# Patient Record
Sex: FEMALE | Race: AMERICAN INDIAN OR ALASKA NATIVE | NOT HISPANIC OR LATINO | URBAN - METROPOLITAN AREA
[De-identification: names, ages, dates, MRNs, and addresses within clinical notes are randomized per-mention and may not be internally consistent; named-entity substitution may affect disease eponyms.]

---

## 2017-03-15 ENCOUNTER — OUTPATIENT (OUTPATIENT)
Dept: OUTPATIENT SERVICES | Facility: HOSPITAL | Age: 67
LOS: 1 days | Discharge: HOME | End: 2017-03-15

## 2017-06-27 DIAGNOSIS — Z00.8 ENCOUNTER FOR OTHER GENERAL EXAMINATION: ICD-10-CM

## 2017-11-16 ENCOUNTER — OUTPATIENT (OUTPATIENT)
Dept: OUTPATIENT SERVICES | Facility: HOSPITAL | Age: 67
LOS: 1 days | Discharge: HOME | End: 2017-11-16

## 2017-11-16 DIAGNOSIS — E03.9 HYPOTHYROIDISM, UNSPECIFIED: ICD-10-CM

## 2017-11-16 DIAGNOSIS — R53.82 CHRONIC FATIGUE, UNSPECIFIED: ICD-10-CM

## 2019-02-08 ENCOUNTER — APPOINTMENT (OUTPATIENT)
Dept: CARDIOTHORACIC SURGERY | Facility: CLINIC | Age: 69
End: 2019-02-08

## 2019-02-08 VITALS
HEIGHT: 64 IN | TEMPERATURE: 98.2 F | DIASTOLIC BLOOD PRESSURE: 77 MMHG | WEIGHT: 116 LBS | RESPIRATION RATE: 13 BRPM | SYSTOLIC BLOOD PRESSURE: 136 MMHG | HEART RATE: 77 BPM | BODY MASS INDEX: 19.81 KG/M2 | OXYGEN SATURATION: 95 %

## 2019-02-08 DIAGNOSIS — Z86.39 PERSONAL HISTORY OF OTHER ENDOCRINE, NUTRITIONAL AND METABOLIC DISEASE: ICD-10-CM

## 2019-02-08 DIAGNOSIS — Z86.79 PERSONAL HISTORY OF OTHER DISEASES OF THE CIRCULATORY SYSTEM: ICD-10-CM

## 2019-02-08 DIAGNOSIS — R91.8 OTHER NONSPECIFIC ABNORMAL FINDING OF LUNG FIELD: ICD-10-CM

## 2019-02-08 DIAGNOSIS — Z84.89 FAMILY HISTORY OF OTHER SPECIFIED CONDITIONS: ICD-10-CM

## 2019-02-08 DIAGNOSIS — Z82.49 FAMILY HISTORY OF ISCHEMIC HEART DISEASE AND OTHER DISEASES OF THE CIRCULATORY SYSTEM: ICD-10-CM

## 2019-02-08 DIAGNOSIS — Z78.9 OTHER SPECIFIED HEALTH STATUS: ICD-10-CM

## 2019-02-08 DIAGNOSIS — Z87.891 PERSONAL HISTORY OF NICOTINE DEPENDENCE: ICD-10-CM

## 2019-02-08 DIAGNOSIS — Z83.3 FAMILY HISTORY OF DIABETES MELLITUS: ICD-10-CM

## 2019-02-08 DIAGNOSIS — Z80.9 FAMILY HISTORY OF MALIGNANT NEOPLASM, UNSPECIFIED: ICD-10-CM

## 2019-02-08 PROBLEM — Z00.00 ENCOUNTER FOR PREVENTIVE HEALTH EXAMINATION: Status: ACTIVE | Noted: 2019-02-08

## 2019-02-08 RX ORDER — CHOLECALCIFEROL (VITAMIN D3) 125 MCG
TABLET ORAL
Refills: 0 | Status: ACTIVE | COMMUNITY

## 2019-02-08 RX ORDER — BIOTIN 10 MG
TABLET ORAL
Refills: 0 | Status: ACTIVE | COMMUNITY

## 2019-02-08 NOTE — PHYSICAL EXAM
[General Appearance - Alert] : alert [General Appearance - In No Acute Distress] : in no acute distress [Sclera] : the sclera and conjunctiva were normal [PERRL With Normal Accommodation] : pupils were equal in size, round, and reactive to light [Extraocular Movements] : extraocular movements were intact [Outer Ear] : the ears and nose were normal in appearance [Oropharynx] : the oropharynx was normal [Neck Appearance] : the appearance of the neck was normal [Neck Cervical Mass (___cm)] : no neck mass was observed [Jugular Venous Distention Increased] : there was no jugular-venous distention [Thyroid Diffuse Enlargement] : the thyroid was not enlarged [Thyroid Nodule] : there were no palpable thyroid nodules [Abnormal Walk] : normal gait [Nail Clubbing] : no clubbing  or cyanosis of the fingernails [Musculoskeletal - Swelling] : no joint swelling seen [Motor Tone] : muscle strength and tone were normal [Skin Color & Pigmentation] : normal skin color and pigmentation [Skin Turgor] : normal skin turgor [] : no rash [Deep Tendon Reflexes (DTR)] : deep tendon reflexes were 2+ and symmetric [Sensation] : the sensory exam was normal to light touch and pinprick [No Focal Deficits] : no focal deficits [Oriented To Time, Place, And Person] : oriented to person, place, and time [Impaired Insight] : insight and judgment were intact [Affect] : the affect was normal

## 2019-02-12 RX ORDER — PROPRANOLOL HYDROCHLORIDE 10 MG/1
10 TABLET ORAL
Refills: 0 | Status: ACTIVE | COMMUNITY

## 2019-02-12 NOTE — CONSULT LETTER
[Dear  ___] : Dear  [unfilled], [Consult Letter:] : I had the pleasure of evaluating your patient, [unfilled]. [Consult Closing:] : Thank you very much for allowing me to participate in the care of this patient.  If you have any questions, please do not hesitate to contact me. 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 She presently denies any SOB, nausea, vomiting, night sweats. She states that she has lost approximately 20 lbs in the past 1 year, unintentional which she attributed originally to her hyperthyroidism.  The patient has middle lobe and lingular lesions, which may raise the question of MAC infection.  She has a upper lobe lesion inferiorly and laterally that has a morphology more consistent with infection, but did not resolve on levofloxacin.  The suspicion is for infectious etiology but we did discuss that we need to ensure that we are not missing a pulmonary malignancy.  I would note that all of the present lesions would not be consistent with breast metastasis based on morphology and distribution.\par \par Plan:   bronchoscopy with LMA tentatively on 2/15/19 in the AM with lavage, separate lavage for each affected segment\par           repeat CT scan\par           surgical vs CT guided biopsy if non resolution [FreeTextEntry3] : Fabian Abreu M.D.\par Chief, Division of Thoracic Surgery\par Department of Cardiothoracic Surgery\par

## 2019-02-12 NOTE — HISTORY OF PRESENT ILLNESS
[Dyslipidemia] : Dyslipidemia [Hypertension] : Hypertension [FreeTextEntry1] : Ms. Alexia Kay is a 69 y/o F who presents to our office for evaluation for Right upper lobe opacity. She presently denies any SOB, nausea, vomiting, night sweats, or cough. She states that she is a former smoker having quit 15-20 years ago, and having smoked 1 pack/week x 10yrs. She states that she has lost approximately 20 lbs in the past 1 year, unintentional, which she attributed originally to her hyperthyroidism which was recently diagnosed 12/2018. At the time of that diagnosis, she originally complained of chest heaviness, SOB, both fleeting and occasional. She was originally sent by her PMD for a CT chest which showed a post infectious/post inflammatory process, for which she was treated with Levoflaxacin x 10days. Approximately 1 week post completion of antibiotic therapy, a PET scan was done. She states that she is presently taking propanolol for HTN, palpitations and tremors of her hands.\par \par Zubrod: 0\par \par Her healthcare team is as follows:\par PMD: Zev Paiz\par Cardio: none\par Pulm: none\par EPS: none\par Hem/onc: none\par Endocrine: Elena\par  [Diabetes Mellitus] : no Diabetes Melllitus [Dialysis] : no dialysis [Infectious Endocarditis] : no infectious endocarditis [Home Oxygen] : no home oxygen use [Inhaled Medication Therapy] : no inhaled medication therapy [Sleep Apnea] : no sleep apnea [Liver Disease] : no liver disease [Immunocompromise Present] : not immunocompromised [Peripheral Artery Disease] : no peripheral artery disease [Unresponsive Neurologic State] : not in a unresponsive neurologic state [Syncope] : no syncope [Cerebrovascular Disease] : no cerebrovascular disease [Prior Myocardial Infarction] : No prior myocardial infarction [Prior Heart Failure] : no prior heart failure

## 2019-02-12 NOTE — DATA REVIEWED
[FreeTextEntry1] : CT Chest w/o IV contrast 12/28/18\par Impression:\par Pulmonary findings are considered post infectious/postinflammatory. Follow-up after antibiotic therapy can BE done. No significant adenopathy. Hypodense mass anterior mediastinum has shape of a very prominent thymus. It is unusually large for patient's age. Hepatic cysts.\par \par PET CT FDG Skull base mid thigh w/o IV contrast 2/1/19\par Impression:\par 1. FDG Avid right middle lobe density and interval increase in FDG avid tree-in-bud opacities in the right upper lobe posteriorly. Although these findings likely represent infection/inflammation, a short-term follow-up diagnostic CT scan or PET/CT scan in 2-3 months is suggested. Correlation with tumor markers is also recommended.\par 2. FDG nonavid residual thymic tissue in the anterior mediastinum.

## 2019-02-12 NOTE — REASON FOR VISIT
[Initial Evaluation] : an initial evaluation [Spouse] : spouse [FreeTextEntry1] : Density/ Opacities Rt upper lobe

## 2019-02-13 ENCOUNTER — OUTPATIENT (OUTPATIENT)
Dept: OUTPATIENT SERVICES | Facility: HOSPITAL | Age: 69
LOS: 1 days | Discharge: HOME | End: 2019-02-13

## 2019-02-13 DIAGNOSIS — R91.8 OTHER NONSPECIFIC ABNORMAL FINDING OF LUNG FIELD: ICD-10-CM

## 2019-02-13 LAB
ANION GAP SERPL CALC-SCNC: 11 MMOL/L
BASOPHILS # BLD AUTO: 0.03 K/UL
BASOPHILS NFR BLD AUTO: 0.4 %
BUN SERPL-MCNC: 12 MG/DL
CALCIUM SERPL-MCNC: 9.1 MG/DL
CHLORIDE SERPL-SCNC: 104 MMOL/L
CO2 SERPL-SCNC: 29 MMOL/L
CREAT SERPL-MCNC: 0.7 MG/DL
EOSINOPHIL # BLD AUTO: 0.13 K/UL
EOSINOPHIL NFR BLD AUTO: 1.9 %
GLUCOSE SERPL-MCNC: 132 MG/DL
HCT VFR BLD CALC: 44 %
HGB BLD-MCNC: 14.3 G/DL
IMM GRANULOCYTES NFR BLD AUTO: 0.1 %
INR PPP: 0.93 RATIO
LYMPHOCYTES # BLD AUTO: 1.33 K/UL
LYMPHOCYTES NFR BLD AUTO: 19.8 %
MAN DIFF?: NORMAL
MCHC RBC-ENTMCNC: 28.3 PG
MCHC RBC-ENTMCNC: 32.5 G/DL
MCV RBC AUTO: 87 FL
MONOCYTES # BLD AUTO: 0.29 K/UL
MONOCYTES NFR BLD AUTO: 4.3 %
NEUTROPHILS # BLD AUTO: 4.92 K/UL
NEUTROPHILS NFR BLD AUTO: 73.5 %
PLATELET # BLD AUTO: 253 K/UL
POTASSIUM SERPL-SCNC: 4.7 MMOL/L
PT BLD: 10.7 SEC
RBC # BLD: 5.06 M/UL
RBC # FLD: 13.2 %
SODIUM SERPL-SCNC: 144 MMOL/L
WBC # FLD AUTO: 6.71 K/UL

## 2019-02-14 ENCOUNTER — FORM ENCOUNTER (OUTPATIENT)
Age: 69
End: 2019-02-14

## 2019-02-15 ENCOUNTER — RESULT REVIEW (OUTPATIENT)
Age: 69
End: 2019-02-15

## 2019-02-15 ENCOUNTER — OUTPATIENT (OUTPATIENT)
Dept: OUTPATIENT SERVICES | Facility: HOSPITAL | Age: 69
LOS: 1 days | Discharge: HOME | End: 2019-02-15

## 2019-02-15 VITALS
HEIGHT: 64 IN | SYSTOLIC BLOOD PRESSURE: 132 MMHG | RESPIRATION RATE: 20 BRPM | DIASTOLIC BLOOD PRESSURE: 74 MMHG | WEIGHT: 117.95 LBS | HEART RATE: 57 BPM | TEMPERATURE: 98 F

## 2019-02-15 VITALS
HEART RATE: 62 BPM | SYSTOLIC BLOOD PRESSURE: 154 MMHG | RESPIRATION RATE: 18 BRPM | DIASTOLIC BLOOD PRESSURE: 88 MMHG | OXYGEN SATURATION: 94 %

## 2019-02-15 DIAGNOSIS — Z90.49 ACQUIRED ABSENCE OF OTHER SPECIFIED PARTS OF DIGESTIVE TRACT: Chronic | ICD-10-CM

## 2019-02-15 DIAGNOSIS — Z90.13 ACQUIRED ABSENCE OF BILATERAL BREASTS AND NIPPLES: Chronic | ICD-10-CM

## 2019-02-15 LAB
GRAM STN FLD: SIGNIFICANT CHANGE UP
SPECIMEN SOURCE: SIGNIFICANT CHANGE UP

## 2019-02-15 RX ORDER — CHOLECALCIFEROL (VITAMIN D3) 125 MCG
1 CAPSULE ORAL
Qty: 0 | Refills: 0 | COMMUNITY

## 2019-02-15 RX ORDER — PROPRANOLOL HCL 160 MG
1 CAPSULE, EXTENDED RELEASE 24HR ORAL
Qty: 0 | Refills: 0 | COMMUNITY

## 2019-02-15 NOTE — H&P ADULT - NSHPPHYSICALEXAM_GEN_ALL_CORE
well appearing female  neck supple  clear breath sounds bilaterally  abdomen nontender  extremities warm and well perfused X 4

## 2019-02-15 NOTE — PROCEDURE NOTE - ADDITIONAL PROCEDURE DETAILS
informed consent was obtained and the patient was taken to the endo suite.  sedation was given and topical anesthesia was given as well.  the bronchoscope was advanced to the distal airways.  separate lavages were taken from the left upper lobe lingula, right upper lobe posterior segment, right middle lobe.  the airway appeared normal in anatomy and pigment.  there were no secretions, however on lavage, especially of the middle lobe, whitish secretions were noted on return of fluid.  all secretions were suctioned free and the patient emerged from anesthesia and was taken to recovery where a post procedural xray will be obtained.

## 2019-02-15 NOTE — ASU DISCHARGE PLAN (ADULT/PEDIATRIC). - DIET
no eating or drinking for 4 hours.  may resume normal diet at 1pm, as long as you can feel your tongue and back of mouth

## 2019-02-15 NOTE — H&P ADULT - ASSESSMENT
plan for bronchoscopy and lavage for assessment of multiple abnormal lung imaging areas.  concern for NTM.

## 2019-02-15 NOTE — H&P ADULT - HISTORY OF PRESENT ILLNESS
patient with lung imaging abnormalities and suspicion of NTM disease.  Plan for bronchoscopy and lavage.  No changes relative to clinic presentation.

## 2019-02-15 NOTE — ASU DISCHARGE PLAN (ADULT/PEDIATRIC). - NOTIFY
shortness of breath or chest pain/Unable to Urinate/Fever greater than 101/Persistent Nausea and Vomiting

## 2019-02-16 LAB
NIGHT BLUE STAIN TISS: SIGNIFICANT CHANGE UP
SPECIMEN SOURCE: SIGNIFICANT CHANGE UP

## 2019-02-17 LAB
CULTURE RESULTS: SIGNIFICANT CHANGE UP
SPECIMEN SOURCE: SIGNIFICANT CHANGE UP

## 2019-02-19 LAB — NON-GYNECOLOGICAL CYTOLOGY STUDY: SIGNIFICANT CHANGE UP

## 2019-02-20 LAB
NON-GYNECOLOGICAL CYTOLOGY STUDY: SIGNIFICANT CHANGE UP
NON-GYNECOLOGICAL CYTOLOGY STUDY: SIGNIFICANT CHANGE UP

## 2019-02-23 DIAGNOSIS — R91.8 OTHER NONSPECIFIC ABNORMAL FINDING OF LUNG FIELD: ICD-10-CM

## 2019-03-15 ENCOUNTER — OTHER (OUTPATIENT)
Age: 69
End: 2019-03-15

## 2019-03-21 LAB
CULTURE RESULTS: SIGNIFICANT CHANGE UP
CULTURE RESULTS: SIGNIFICANT CHANGE UP
SPECIMEN SOURCE: SIGNIFICANT CHANGE UP
SPECIMEN SOURCE: SIGNIFICANT CHANGE UP

## 2019-04-09 LAB
CULTURE RESULTS: SIGNIFICANT CHANGE UP
SPECIMEN SOURCE: SIGNIFICANT CHANGE UP

## 2019-04-18 LAB
CULTURE RESULTS: SIGNIFICANT CHANGE UP
SPECIMEN SOURCE: SIGNIFICANT CHANGE UP

## 2019-09-23 NOTE — ASSESSMENT
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 She presently denies any SOB, nausea, vomiting, night sweats. She states that she has lost approximately 20 lbs in the past 1 year, unintentional which she attributed originally to her hyperthyroidism.  The patient has middle lobe and lingular lesions, which may raise the question of MAC infection.  She has a upper lobe lesion inferiorly and laterally that has a morphology more consistent with infection, but did not resolve on levofloxacin.  The suspicion is for infectious etiology but we did discuss that we need to ensure that we are not missing a pulmonary malignancy.  I would note that all of the present lesions would not be consistent with breast metastasis based on morphology and distribution.\par \par Plan:   bronchoscopy in the next week or two with LMA tentatively on 2/15/19 in the AM with TIVA (total IV            anesthetic)\par           repeat CT scan\par           surgical vs CT guided biopsy if non resolution [de-identified] : Lumbar radiculopathy\par Skelaxin prescription \par MRI lumbar spine.\par Did PT and Chiropractic care and meds.\par Patient would like to undergo an MRI lumbar spine.\par All options were discussed including rest, medicine, chiropractor, acupuncture, , PT , pain management, and last resort surgery. \par All questions were answered, all alternatives were discussed and the patient is in complete agreement with that plan. Follow-up appointment as instructed. Any issues and the patient will call or come in sooner.\par

## 2020-03-04 NOTE — ASU PATIENT PROFILE, ADULT - PAIN SCALE PREFERRED, PROFILE
"Chief complaint:   Chief Complaint   Patient presents with   â¢ Medication Management     ADHD        Vitals:  Visit Vitals  /72   Pulse 68   Resp 16   Ht 5' 2.5"" (1.588 m)   Wt 55.8 kg   LMP 12/16/2019 (Approximate)   BMI 22.14 kg/mÂ²       HISTORY OF PRESENT ILLNESS     32year old female comes here to follow up on below mentioned concerns:      1. ADHD: Patient was diagnosed 2 years ago, was initially evaluated by [facility] in [facility] (notes in media section). Currently on adderrall, She denies any side effects, vitals stable. She will smoke marijuana occasionaly to help her sleep. Interim hx:   - adderall increased to 20 mg BID. - also on lexapro 10 mg daily. - says her symptoms have improved a lot after restarting lexapro. Other significant problems: There are no active problems to display for this patient. PAST MEDICAL, FAMILY AND SOCIAL HISTORY     Medications:  Current Outpatient Medications   Medication   â¢ amphetamine-dextroamphetamine (ADDERALL) 20 MG tablet   â¢ escitalopram (LEXAPRO) 10 MG tablet   â¢ Cholecalciferol (VITAMIN D) 2000 units tablet   â¢ fluticasone (FLONASE) 50 MCG/ACT nasal spray     No current facility-administered medications for this visit.         Allergies:  ALLERGIES:   Allergen Reactions   â¢ Azithromycin DIARRHEA and Nausea & Vomiting   â¢ Lactase   (Food Or Med) DIARRHEA and Other (See Comments)     Abdominal pain/diarrhea   â¢ Sulfa Antibiotics VOMITING and RASH     Fever and Diarrhea       Past Medical  History/Surgeries:  Past Medical History:   Diagnosis Date   â¢ Anxiety    â¢ Head ache    â¢ IBS (irritable bowel syndrome)    â¢ PTSD (post-traumatic stress disorder)        Past Surgical History:   Procedure Laterality Date   â¢ Appendectomy         Family History:  Family History   Problem Relation Age of Onset   â¢ Cancer Mother         breast   â¢ Kidney disease Mother    â¢ Cancer Maternal Grandmother         skin   â¢ Cancer Maternal Grandfather    â¢ Diabetes " Paternal Grandmother    â¢ Heart disease Neg Hx    â¢ Stroke Neg Hx        Social History:  Social History     Tobacco Use   â¢ Smoking status: Never Smoker   â¢ Smokeless tobacco: Never Used   Substance Use Topics   â¢ Alcohol use: Yes       REVIEW OF SYSTEMS     Review of Systems   Constitutional: Negative for activity change and appetite change. HENT: Negative for drooling and ear discharge. Eyes: Negative for discharge and itching. Respiratory: Negative for chest tightness. Neurological: Negative for facial asymmetry and speech difficulty. Psychiatric/Behavioral: Negative for agitation and confusion. PHYSICAL EXAM     Physical Exam   Constitutional: She appears well-developed and well-nourished. No distress. HENT:   Head: Normocephalic and atraumatic. Right Ear: External ear normal.   Left Ear: External ear normal.   Eyes: Conjunctivae and EOM are normal. Right eye exhibits no discharge. Left eye exhibits no discharge. Neck: Normal range of motion. Neck supple. Pulmonary/Chest: Effort normal. No stridor. No respiratory distress. Neurological: She is alert. No cranial nerve deficit. Coordination normal.   Skin: Skin is warm and dry. She is not diaphoretic. Psychiatric: She has a normal mood and affect. Her behavior is normal.   Nursing note and vitals reviewed. ASSESSMENT/PLAN     Amado Hendricks was seen today for follow up. Diagnoses and all orders for this visit:    Attention deficit hyperactivity disorder (ADHD), unspecified ADHD type    Continue with adderall 20 mg BID. Explained to the patient that I am not willing to increase her stimulant dose further and she should cut down on marijuana[takes mainly for insomnia]. Patient verbalized understanding. RTC in 6 months. Anxiety and depression    Continue with 10 mg lexapro. - PHQ 9:6, AMANDA 7:4  - RTC in 6 months. - advised to use light box during winter time for depression. none

## 2020-05-11 NOTE — ASU PREOP CHECKLIST - DENTURES
To ER if acutely worse,OTC antihistamines as label directed  Follow up with your PCP in 3 - 4 days if worsening or not improving:    Pancho Barragan MD  [address] [facility] [address]  [phone]    Patient Education     Nonspecific Dermatitis  Dermatitis is a skin rash caused by something that touches the skin and makes it irritated and inflamed.  Your skin may be red, swollen, dry, and may be cracked. Blisters may form and ooze. The rash will itch.  Dermatitis can form on the face and neck, backs of hands, forearms, genitals, and lower legs. Dermatitis is not passed from person to person.  Talk with your health care provider about what may have caused the rash. Common things that cause skin allergies are metal in jewelry, plants like poison ivy or poison oak, and certain skin care products. You will need to avoid the source of your rash in the future to prevent it from coming back. In some cases, the cause of the dermatitis may not be found.  Treatment is done to relieve itching and prevent the rash from coming back. The rash should go away in a few days to a few weeks.  Home care  The health care provider may prescribe medications to relieve swelling and itching. Follow all instructions when using these medications.  · Avoid anything that heats up your skin, such as hot showers or baths, or direct sunlight. This can make itching worse.  · Stay away from whatever you think caused the rash.  · Bathe in warm, not hot, water. Apply a moisturizing lotion after bathing to prevent dry skin.  · Avoid skin irritants such as wool or silk clothing, grease, oils, harsh soaps, and detergents.  · Apply cold compresses to soothe your sores to help relieve your symptoms. Do this for 30 minutes 3 to 4 times a day. You can make a cold compress by soaking a cloth in cold water. Squeeze out excess water. You can add colloidal oatmeal to the water to help reduce itching. For severe itching in a small area, apply an ice  pack wrapped in a thin towel. Do this for 20 minutes 3 to 4 times a day.  · You can also help relieve large areas of itching by taking a lukewarm bath with colloidal oatmeal added to the water.  · Use hydrocortisone cream for redness and irritation, unless another medicine was prescribed. You can also use benzocaine anesthetic cream or spray.  · Use oral diphenhydramine to help reduce itching. This is an antihistamine you can buy at drug and grocery stores. It can make you sleepy, so use lower doses during the daytime. Or you can use loratadine. This is an antihistamine that will not make you sleepy. Don’t use diphenhydramine if you have glaucoma or have trouble urinating because of an enlarged prostate.  · Wash your hands or use an antibacterial gel often to prevent the spread of the rash.  Follow-up care  Follow up with your health care provider. Make an appointment with your health care provider if your symptoms do not get better in the next 1 to 2 weeks.  When to seek medical advice  Call your health care provider right away if any of these occur:  · Spreading of the rash to other parts of your body  · Severe swelling of your face, eyelids, mouth, throat or tongue  · Trouble urinating due to swelling in the genital area  · Fever of 100.4°F (38°C) or higher  · Redness or swelling that gets worse  · Pain that gets worse  · Foul-smelling fluid leaking from the skin  · Yellow-brown crusts on the open blisters  · Joint pain   © 3986-7176 [employer]. [address] [place] [address], [place] [address]. All rights reserved. This information is not intended as a substitute for professional medical care. Always follow your healthcare professional's instructions.         Thank you for choosing [facility] for your healthcare needs.    We strive to provide you with excellent service and hope that we have exceeded your expectations.     If you receive a survey in the mail, we hope that you  will complete it and agree that we have provided \"Very Good\" care today.  If you would like to speak to us about your visit, please contact our center at:    [facility]  Telephone [phone]  [facility]  Telephone [phone]  [facility]  Telephone [phone]    _____________________     no

## 2022-01-06 ENCOUNTER — TRANSCRIPTION ENCOUNTER (OUTPATIENT)
Age: 72
End: 2022-01-06

## 2022-06-21 PROBLEM — C50.919 MALIGNANT NEOPLASM OF UNSPECIFIED SITE OF UNSPECIFIED FEMALE BREAST: Chronic | Status: ACTIVE | Noted: 2019-02-15

## 2022-06-21 PROBLEM — I10 ESSENTIAL (PRIMARY) HYPERTENSION: Chronic | Status: ACTIVE | Noted: 2019-02-15

## 2022-06-21 PROBLEM — E05.90 THYROTOXICOSIS, UNSPECIFIED WITHOUT THYROTOXIC CRISIS OR STORM: Chronic | Status: ACTIVE | Noted: 2019-02-15

## 2022-07-28 ENCOUNTER — APPOINTMENT (OUTPATIENT)
Age: 72
End: 2022-07-28

## 2022-07-28 VITALS
SYSTOLIC BLOOD PRESSURE: 112 MMHG | DIASTOLIC BLOOD PRESSURE: 68 MMHG | HEART RATE: 81 BPM | RESPIRATION RATE: 14 BRPM | WEIGHT: 118 LBS | HEIGHT: 64 IN | OXYGEN SATURATION: 97 % | BODY MASS INDEX: 20.14 KG/M2

## 2022-07-28 DIAGNOSIS — J45.909 UNSPECIFIED ASTHMA, UNCOMPLICATED: ICD-10-CM

## 2022-07-28 DIAGNOSIS — J47.9 BRONCHIECTASIS, UNCOMPLICATED: ICD-10-CM

## 2022-07-28 DIAGNOSIS — A31.0 PULMONARY MYCOBACTERIAL INFECTION: ICD-10-CM

## 2022-07-28 PROCEDURE — 94010 BREATHING CAPACITY TEST: CPT

## 2022-07-28 PROCEDURE — 99203 OFFICE O/P NEW LOW 30 MIN: CPT | Mod: 25

## 2022-07-28 RX ORDER — ALBUTEROL SULFATE 90 UG/1
108 (90 BASE) AEROSOL, METERED RESPIRATORY (INHALATION)
Qty: 1 | Refills: 3 | Status: ACTIVE | COMMUNITY
Start: 2022-07-28 | End: 1900-01-01

## 2022-07-28 NOTE — ASSESSMENT
[FreeTextEntry1] : Bronchiectasis \par HO MAC SP Therapy by Dr. Sawant \par SOB possible HAAD \par SP cardiac work up

## 2022-07-28 NOTE — HISTORY OF PRESENT ILLNESS
[Intermittent] : intermittent [Doing Well] : doing well [Well Controlled] : Well controlled [Checks Regularly] : The patient checks ~his/her~ peak flow regularly [Good Control] : peak flow has been good [None] : The patient is not currently on any medications for ~his/her~ asthma [Initial Evaluation] : an initial evaluation of [Dyspnea] : dyspnea [Currently Experiencing] : The patient is currently experiencing symptoms.

## 2022-12-08 ENCOUNTER — APPOINTMENT (OUTPATIENT)
Age: 72
End: 2022-12-08

## 2025-03-27 ENCOUNTER — NON-APPOINTMENT (OUTPATIENT)
Age: 75
End: 2025-03-27

## 2025-03-27 DIAGNOSIS — Z78.9 OTHER SPECIFIED HEALTH STATUS: ICD-10-CM

## 2025-03-27 DIAGNOSIS — Z82.49 FAMILY HISTORY OF ISCHEMIC HEART DISEASE AND OTHER DISEASES OF THE CIRCULATORY SYSTEM: ICD-10-CM

## 2025-03-27 DIAGNOSIS — Z92.89 PERSONAL HISTORY OF OTHER MEDICAL TREATMENT: ICD-10-CM

## 2025-03-27 DIAGNOSIS — Z80.3 FAMILY HISTORY OF MALIGNANT NEOPLASM OF BREAST: ICD-10-CM

## 2025-03-27 DIAGNOSIS — N81.11 CYSTOCELE, MIDLINE: ICD-10-CM

## 2025-03-27 DIAGNOSIS — Z80.0 FAMILY HISTORY OF MALIGNANT NEOPLASM OF DIGESTIVE ORGANS: ICD-10-CM

## 2025-03-27 RX ORDER — VALSARTAN 40 MG/1
40 TABLET ORAL
Refills: 0 | Status: ACTIVE | COMMUNITY